# Patient Record
Sex: FEMALE | Race: WHITE | Employment: FULL TIME | URBAN - METROPOLITAN AREA
[De-identification: names, ages, dates, MRNs, and addresses within clinical notes are randomized per-mention and may not be internally consistent; named-entity substitution may affect disease eponyms.]

---

## 2017-08-10 ENCOUNTER — HOSPITAL ENCOUNTER (OUTPATIENT)
Dept: MAMMOGRAPHY | Age: 42
Discharge: HOME | End: 2017-08-10
Attending: OBSTETRICS & GYNECOLOGY
Payer: COMMERCIAL

## 2017-08-10 DIAGNOSIS — Z12.31 VISIT FOR SCREENING MAMMOGRAM: ICD-10-CM

## 2017-08-10 PROCEDURE — 77067 SCR MAMMO BI INCL CAD: CPT | Performed by: OBSTETRICS & GYNECOLOGY

## 2017-10-16 RX ORDER — NORETHINDRONE ACETATE AND ETHINYL ESTRADIOL AND FERROUS FUMARATE 1MG-20(21)
KIT ORAL
Qty: 28 TABLET | Refills: 5 | OUTPATIENT
Start: 2017-10-16

## 2017-10-19 RX ORDER — NORETHINDRONE ACETATE AND ETHINYL ESTRADIOL 1MG-20(21)
1 KIT ORAL
Qty: 3 PACKAGE | Refills: 0 | Status: SHIPPED | OUTPATIENT
Start: 2017-10-19 | End: 2018-01-03

## 2017-10-19 NOTE — TELEPHONE ENCOUNTER
Medications refilled per protocol. Pt has annual scheduled in January. Pt informed and verbalizes understanding.

## 2018-01-03 ENCOUNTER — OFFICE VISIT (OUTPATIENT)
Dept: OBGYN CLINIC | Facility: CLINIC | Age: 43
End: 2018-01-03

## 2018-01-03 VITALS
SYSTOLIC BLOOD PRESSURE: 112 MMHG | HEART RATE: 81 BPM | WEIGHT: 148.81 LBS | BODY MASS INDEX: 22.82 KG/M2 | DIASTOLIC BLOOD PRESSURE: 73 MMHG | HEIGHT: 67.75 IN

## 2018-01-03 DIAGNOSIS — Z12.31 VISIT FOR SCREENING MAMMOGRAM: ICD-10-CM

## 2018-01-03 DIAGNOSIS — G43.829 MENSTRUAL MIGRAINE WITHOUT STATUS MIGRAINOSUS, NOT INTRACTABLE: ICD-10-CM

## 2018-01-03 DIAGNOSIS — Z12.4 SCREENING FOR MALIGNANT NEOPLASM OF CERVIX: Primary | ICD-10-CM

## 2018-01-03 DIAGNOSIS — Z01.419 ENCOUNTER FOR GYNECOLOGICAL EXAMINATION WITHOUT ABNORMAL FINDING: ICD-10-CM

## 2018-01-03 PROCEDURE — 99212 OFFICE O/P EST SF 10 MIN: CPT | Performed by: OBSTETRICS & GYNECOLOGY

## 2018-01-03 PROCEDURE — 99396 PREV VISIT EST AGE 40-64: CPT | Performed by: OBSTETRICS & GYNECOLOGY

## 2018-01-03 RX ORDER — NORETHINDRONE ACETATE AND ETHINYL ESTRADIOL 1MG-20(21)
1 KIT ORAL
Qty: 3 PACKAGE | Refills: 3 | Status: SHIPPED | OUTPATIENT
Start: 2018-01-03 | End: 2018-06-04

## 2018-01-03 NOTE — PROGRESS NOTES
Kendal Palacio is a 40year old female [id number] Patient's last menstrual period was 12/18/2017 (within days).   Patient presents with:  Gyn Exam: annual exam & mammo order  Refill Request: Norethin Ace-Eth Estrad-FE ([address] [place] [address] 1/20) 1-20 MG-MCG   Pt c Clobetasol Propionate 0.05 % External Ointment, Apply to affected areas 1x/day as needed, Disp: 60 g, Rfl: 5    ALLERGIES:    Bactrim [Sulfametho*      Sulfamethoxazole        Hives      Review of Systems:  Constitutional:  Denies fatigue, night sweats, h position, mobility, without tenderness  Adnexa: normal without masses or tenderness  Perineum: normal  Anus: no hemorroids     Assessment & Plan:    Encounter for gynecological examination without abnormal finding  Menstrual migraine without status migrain

## 2018-01-04 LAB
HPV I/H RISK 1 DNA SPEC QL NAA+PROBE: NEGATIVE
LAST PAP RESULT: NORMAL

## 2018-01-09 RX ORDER — NORETHINDRONE ACETATE AND ETHINYL ESTRADIOL AND FERROUS FUMARATE 1MG-20(21)
1 KIT ORAL
Qty: 84 TABLET | Refills: 0 | OUTPATIENT
Start: 2018-01-09

## 2018-03-16 ENCOUNTER — TELEPHONE (OUTPATIENT)
Dept: OBGYN CLINIC | Facility: CLINIC | Age: 43
End: 2018-03-16

## 2018-03-16 NOTE — TELEPHONE ENCOUNTER
Pt states she is still having headaches when taking the brown pills on her BC. Asking if she should be switched to a 21 day instead of 28 day. pls adv.

## 2018-03-16 NOTE — TELEPHONE ENCOUNTER
Pt calling to report that she saw CAP for her annual in January. Pt stated that she has been on microgestin and continues to have headaches during the placebo pills in her pack.  Pt stated that she has tried hot tea & caffeine like CAP had suggested with no

## 2018-03-17 NOTE — TELEPHONE ENCOUNTER
Changing to a 21 day pack will not be helpful because all that does is leave out the placebo pills. We can try switching her to a 24 day pill  To extend the active pills ,such as minastrin but her insurance may not cover.   If that does not work then we ma

## 2018-03-19 RX ORDER — NORETHINDRONE ACETATE AND ETHINYL ESTRADIOL AND FERROUS FUMARATE 1MG-20(24)
1 KIT ORAL DAILY
Qty: 28 TABLET | Refills: 2 | Status: CANCELLED | OUTPATIENT
Start: 2018-03-19 | End: 2018-04-16

## 2018-03-19 NOTE — TELEPHONE ENCOUNTER
PT NOTIFIED NEW RX SENT FOR 3 MONTHS. ADVISED TO CALL US BACK BEFORE SHE RUNS OUT TO LET US KNOW HOW SHE IS DOING. PT VERBALIZED UNDERSTANDING.

## 2018-03-19 NOTE — TELEPHONE ENCOUNTER
Informed pt that CAP stated changing to a 21 day pack will not be helpful, because all that does is leave out the placebo pills.   Informed pt that CAP stated that we can try switching her to a 24 day pill, to extend the active pills, such as Minastrin but

## 2018-06-01 ENCOUNTER — TELEPHONE (OUTPATIENT)
Dept: PEDIATRICS CLINIC | Facility: CLINIC | Age: 43
End: 2018-06-01

## 2018-06-02 RX ORDER — NORETHINDRONE ACETATE AND ETHINYL ESTRADIOL AND FERROUS FUMARATE 1MG-20(24)
1 KIT ORAL DAILY
Qty: 28 TABLET | Refills: 6 | Status: SHIPPED | OUTPATIENT
Start: 2018-06-02 | End: 2018-06-04

## 2018-06-04 ENCOUNTER — TELEPHONE (OUTPATIENT)
Dept: OBGYN CLINIC | Facility: CLINIC | Age: 43
End: 2018-06-04

## 2018-06-04 RX ORDER — NORETHINDRONE ACETATE AND ETHINYL ESTRADIOL 1MG-20(21)
1 KIT ORAL
Qty: 3 PACKAGE | Refills: 1 | Status: SHIPPED | OUTPATIENT
Start: 2018-06-04 | End: 2018-12-17

## 2018-06-04 NOTE — TELEPHONE ENCOUNTER
Spoke to pharmacist who states pt came in to refill her rx and noticed it was now minastrin fe chewable tabs. Pt was not expecting this. See notes from previous phone call, pt requested this specifically.  Pharmacist states pt was given junel fe (generic of

## 2018-12-11 RX ORDER — NORETHINDRONE ACETATE/ETHINYL ESTRADIOL AND FERROUS FUMARATE 1MG-20(21)
KIT ORAL
Qty: 56 TABLET | Refills: 0 | OUTPATIENT
Start: 2018-12-11

## 2018-12-11 NOTE — TELEPHONE ENCOUNTER
LAST ANNUAL 1-3-18 (PAP NORMAL), LAST MAMMO 8-10-17 AND NO FUTURE APPT MADE. SENT BACK RX DENIED, NEEDS APPT.

## 2018-12-11 NOTE — TELEPHONE ENCOUNTER
HAS PILLS FOR THIS WEEK AND NEXT AND THEN SHE IS OUT. OK TO REFILL UNTIL 2-14-19? OR DOES SHE NEED HER MAMMO FIRST?

## 2018-12-14 NOTE — TELEPHONE ENCOUNTER
LMTCB. Need to confirm if she wants the rx sent to [facility].  Last rx sent to [facility] in [facility] in 6/2018.

## 2018-12-17 RX ORDER — NORETHINDRONE ACETATE AND ETHINYL ESTRADIOL 1MG-20(21)
1 KIT ORAL
Qty: 1 PACKAGE | Refills: 1 | Status: SHIPPED | OUTPATIENT
Start: 2018-12-17 | End: 2019-04-11

## 2018-12-20 ENCOUNTER — HOSPITAL ENCOUNTER (OUTPATIENT)
Dept: MAMMOGRAPHY | Age: 43
Discharge: HOME | End: 2018-12-20
Attending: OBSTETRICS & GYNECOLOGY
Payer: COMMERCIAL

## 2018-12-20 DIAGNOSIS — Z12.31 VISIT FOR SCREENING MAMMOGRAM: ICD-10-CM

## 2018-12-20 PROCEDURE — 77063 BREAST TOMOSYNTHESIS BI: CPT | Performed by: OBSTETRICS & GYNECOLOGY

## 2018-12-20 PROCEDURE — 77067 SCR MAMMO BI INCL CAD: CPT | Performed by: OBSTETRICS & GYNECOLOGY

## 2019-04-01 ENCOUNTER — TELEPHONE (OUTPATIENT)
Dept: OBGYN CLINIC | Facility: CLINIC | Age: 44
End: 2019-04-01

## 2019-04-01 RX ORDER — NORETHINDRONE ACETATE AND ETHINYL ESTRADIOL 1MG-20(21)
1 KIT ORAL DAILY
Qty: 1 PACKAGE | Refills: 0 | Status: SHIPPED | OUTPATIENT
Start: 2019-04-01 | End: 2019-04-11

## 2019-04-01 NOTE — TELEPHONE ENCOUNTER
Pt states in December Microgestin Fe 1/20 was sent to [facility] and she did well with it. Pt asking for one more refill be sent, because she is due to start the pill today. Pt has her Annual scheduled 4/2019 with CAP.     Mammogram from 12/2018 was normal.

## 2019-04-10 ENCOUNTER — TELEPHONE (OUTPATIENT)
Dept: PEDIATRICS CLINIC | Facility: CLINIC | Age: 44
End: 2019-04-10

## 2019-04-10 NOTE — TELEPHONE ENCOUNTER
Pt needed to reschedule annual due to work, so she can't come in until 7/11. Would like to have birth control refilled until then and sent to [facility] in [place].  pls adv

## 2019-04-10 NOTE — TELEPHONE ENCOUNTER
Pt was already given 1 pack of OCP on 4/1/19 to cover her until her annual that was originally scheduled for 4/25 but pt had to cancel appt. Message to CAP if OK for 3 additional refills of OCP to cover pt until annual scheduled for 7/11?

## 2019-04-11 RX ORDER — NORETHINDRONE ACETATE AND ETHINYL ESTRADIOL 1MG-20(21)
1 KIT ORAL DAILY
Qty: 3 PACKAGE | Refills: 0 | Status: SHIPPED | OUTPATIENT
Start: 2019-04-11 | End: 2019-08-01

## 2019-04-11 NOTE — TELEPHONE ENCOUNTER
PT NOTIFIED AUTHORIZATION FOR 3 MORE PACKS WAS SENT TO HER PHARMACY. PT AWARE SHE WILL GET NO FURTHER REFILLS WITHOUT BEING SEEN.

## 2019-04-11 NOTE — TELEPHONE ENCOUNTER
Ok to give additional refills until seen in July but please let her know that we cannot refill again if this appt not kept

## 2019-08-01 ENCOUNTER — OFFICE VISIT (OUTPATIENT)
Dept: OBGYN CLINIC | Facility: CLINIC | Age: 44
End: 2019-08-01
Payer: COMMERCIAL

## 2019-08-01 VITALS
BODY MASS INDEX: 25.14 KG/M2 | SYSTOLIC BLOOD PRESSURE: 122 MMHG | HEART RATE: 85 BPM | HEIGHT: 67 IN | WEIGHT: 160.19 LBS | DIASTOLIC BLOOD PRESSURE: 82 MMHG

## 2019-08-01 DIAGNOSIS — Z01.419 ENCOUNTER FOR GYNECOLOGICAL EXAMINATION WITHOUT ABNORMAL FINDING: Primary | ICD-10-CM

## 2019-08-01 DIAGNOSIS — Z12.31 VISIT FOR SCREENING MAMMOGRAM: ICD-10-CM

## 2019-08-01 PROCEDURE — 99396 PREV VISIT EST AGE 40-64: CPT | Performed by: OBSTETRICS & GYNECOLOGY

## 2019-08-01 RX ORDER — NORETHINDRONE ACETATE AND ETHINYL ESTRADIOL 1MG-20(21)
1 KIT ORAL DAILY
Qty: 3 PACKAGE | Refills: 3 | Status: SHIPPED | OUTPATIENT
Start: 2019-08-01 | End: 2020-06-22

## 2019-08-01 NOTE — PROGRESS NOTES
Desirae Moya is a 38year old female [id number] Patient's last menstrual period was 07/27/2019. Patient presents with:  Gyn Exam: annual  Medication Request: OCP refill  Her cycles are  regular. She has no complaints.     OBSTETRICS HISTORY:  OB History Attends meetings of clubs or organizations: Not on file        Relationship status: Not on file      Intimate partner violence:        Fear of current or ex partner: Not on file        Emotionally abused: Not on file        Physically abused: Not on file anxiety. Endocrine:   denies excessive thirst or urination. Heme/Lymph:  denies history of anemia, easy bruising or bleeding.       PHYSICAL EXAM:   Constitutional: well developed, well nourished  Head/Face: normocephalic  Neck/Thyroid: thyroid symmetric,

## 2020-01-02 ENCOUNTER — HOSPITAL ENCOUNTER (OUTPATIENT)
Dept: MAMMOGRAPHY | Age: 45
Discharge: HOME | End: 2020-01-02
Attending: OBSTETRICS & GYNECOLOGY
Payer: COMMERCIAL

## 2020-01-02 DIAGNOSIS — Z12.31 VISIT FOR SCREENING MAMMOGRAM: ICD-10-CM

## 2020-01-02 PROCEDURE — 77063 BREAST TOMOSYNTHESIS BI: CPT | Performed by: OBSTETRICS & GYNECOLOGY

## 2020-01-02 PROCEDURE — 77067 SCR MAMMO BI INCL CAD: CPT | Performed by: OBSTETRICS & GYNECOLOGY

## 2020-06-22 RX ORDER — NORETHINDRONE ACETATE/ETHINYL ESTRADIOL AND FERROUS FUMARATE 1MG-20(21)
KIT ORAL
Qty: 84 TABLET | Refills: 0 | OUTPATIENT
Start: 2020-06-22

## 2020-06-22 NOTE — TELEPHONE ENCOUNTER
Last annual was 8/1/2019. Last pap was 1/3/2018 - normal  Last mammo was 8/1/2019 - negative    Annual appt made on 8/31/2020. Refills for 3 months pended. Contraindication for med with iron supplements.   Message to CAP for approval.

## 2020-06-24 RX ORDER — NORETHINDRONE ACETATE AND ETHINYL ESTRADIOL 1MG-20(21)
1 KIT ORAL DAILY
Qty: 3 PACKAGE | Refills: 0 | Status: SHIPPED | OUTPATIENT
Start: 2020-06-24 | End: 2020-09-21

## 2020-09-21 RX ORDER — NORETHINDRONE ACETATE/ETHINYL ESTRADIOL AND FERROUS FUMARATE 1MG-20(21)
KIT ORAL
Qty: 84 TABLET | Refills: 0 | Status: SHIPPED | OUTPATIENT
Start: 2020-09-21 | End: 2020-11-02

## 2020-09-21 NOTE — TELEPHONE ENCOUNTER
LAST ANNUAL 8-1-19, LAST PAP 1-3-18, LAST MAMMO 8-1-19 AND NEXT ANNUAL 11-2-20. REQUEST TO CHANGE TO [facility] SINCE MICROGESTIN IS NOT AVAILABLE. AUTHORIZATION FOR #84 SENT TO PHARMACY PER PROTOCOL.

## 2020-11-02 ENCOUNTER — TELEPHONE (OUTPATIENT)
Dept: PEDIATRICS CLINIC | Facility: CLINIC | Age: 45
End: 2020-11-02

## 2020-11-02 ENCOUNTER — OFFICE VISIT (OUTPATIENT)
Dept: OBGYN CLINIC | Facility: CLINIC | Age: 45
End: 2020-11-02
Payer: COMMERCIAL

## 2020-11-02 VITALS
HEART RATE: 77 BPM | WEIGHT: 162 LBS | BODY MASS INDEX: 25 KG/M2 | SYSTOLIC BLOOD PRESSURE: 135 MMHG | DIASTOLIC BLOOD PRESSURE: 88 MMHG

## 2020-11-02 DIAGNOSIS — Z12.31 VISIT FOR SCREENING MAMMOGRAM: ICD-10-CM

## 2020-11-02 DIAGNOSIS — Z01.419 ENCOUNTER FOR GYNECOLOGICAL EXAMINATION WITHOUT ABNORMAL FINDING: Primary | ICD-10-CM

## 2020-11-02 PROCEDURE — 99396 PREV VISIT EST AGE 40-64: CPT | Performed by: OBSTETRICS & GYNECOLOGY

## 2020-11-02 PROCEDURE — 3075F SYST BP GE 130 - 139MM HG: CPT | Performed by: OBSTETRICS & GYNECOLOGY

## 2020-11-02 PROCEDURE — 3079F DIAST BP 80-89 MM HG: CPT | Performed by: OBSTETRICS & GYNECOLOGY

## 2020-11-02 RX ORDER — NORETHINDRONE ACETATE AND ETHINYL ESTRADIOL 1MG-20(21)
1 KIT ORAL DAILY
Qty: 2 PACKAGE | Refills: 3 | Status: SHIPPED | OUTPATIENT
Start: 2020-11-02 | End: 2020-11-02

## 2020-11-02 RX ORDER — NORETHINDRONE ACETATE AND ETHINYL ESTRADIOL 1MG-20(21)
1 KIT ORAL DAILY
Qty: 2 PACKAGE | Refills: 3 | Status: SHIPPED | OUTPATIENT
Start: 2020-11-02 | End: 2021-07-27

## 2020-11-02 NOTE — TELEPHONE ENCOUNTER
Patient stating her refill was sent to the wrong pharmacy.      [facility] pharmacy in [place] received the refill and she would like it to be sent to the pharmacy in [address] [place] [address].

## 2020-11-02 NOTE — PROGRESS NOTES
Divya Stack is a 41year old female [id number] Patient's last menstrual period was 10/29/2020. Patient presents with:  Gyn Exam  .     Her cycles are regular on ocps. She had some vaginal tingling immediately prior to her menses- no vaginal d/c.   This Yazid[employer] service: Not on file        Active member of club or organization: Not on file        Attends meetings of clubs or organizations: Not on file        Relationship status: Not on file      Intimate partner violence        Fear of current or ex part weakness or numbness. Psychiatric: denies depression or anxiety. Endocrine:   denies excessive thirst or urination. Heme/Lymph:  denies history of anemia, easy bruising or bleeding.       PHYSICAL EXAM:   Constitutional: well developed, well nourished  H

## 2021-01-14 ENCOUNTER — HOSPITAL ENCOUNTER (OUTPATIENT)
Dept: MAMMOGRAPHY | Age: 46
Discharge: HOME | End: 2021-01-14
Attending: OBSTETRICS & GYNECOLOGY
Payer: COMMERCIAL

## 2021-01-14 DIAGNOSIS — Z12.31 VISIT FOR SCREENING MAMMOGRAM: ICD-10-CM

## 2021-01-14 PROCEDURE — 77067 SCR MAMMO BI INCL CAD: CPT | Performed by: OBSTETRICS & GYNECOLOGY

## 2021-01-14 PROCEDURE — 77063 BREAST TOMOSYNTHESIS BI: CPT | Performed by: OBSTETRICS & GYNECOLOGY

## 2021-01-18 ENCOUNTER — TELEPHONE (OUTPATIENT)
Dept: OBGYN CLINIC | Facility: CLINIC | Age: 46
End: 2021-01-18

## 2021-01-18 NOTE — TELEPHONE ENCOUNTER
----- Message from Liliya Peralta MD sent at 1/15/2021 10:24 AM CST -----  Incomplete mammogram,needs additional views,call pt

## 2021-02-18 ENCOUNTER — HOSPITAL ENCOUNTER (OUTPATIENT)
Dept: MAMMOGRAPHY | Facility: HOSPITAL | Age: 46
Discharge: HOME | End: 2021-02-18
Attending: OBSTETRICS & GYNECOLOGY
Payer: COMMERCIAL

## 2021-02-18 DIAGNOSIS — R92.2 INCONCLUSIVE MAMMOGRAM: ICD-10-CM

## 2021-02-18 PROCEDURE — 76642 ULTRASOUND BREAST LIMITED: CPT | Performed by: OBSTETRICS & GYNECOLOGY

## 2021-02-18 PROCEDURE — 77061 BREAST TOMOSYNTHESIS UNI: CPT | Performed by: OBSTETRICS & GYNECOLOGY

## 2021-02-18 PROCEDURE — 77065 DX MAMMO INCL CAD UNI: CPT | Performed by: OBSTETRICS & GYNECOLOGY

## 2021-04-01 DIAGNOSIS — Z23 NEED FOR VACCINATION: ICD-10-CM

## 2021-07-23 NOTE — TELEPHONE ENCOUNTER
PSR please call pt and assist with scheduling annual due in November. Once appt is made then please send message back to address the rx refills. Thanks.   (last annual was 11/2020, 02/2021 Benign mammo)

## 2021-07-26 RX ORDER — NORETHINDRONE ACETATE AND ETHINYL ESTRADIOL AND FERROUS FUMARATE 1MG-20(21)
KIT ORAL
Qty: 56 TABLET | Refills: 0 | OUTPATIENT
Start: 2021-07-26

## 2021-07-27 RX ORDER — NORETHINDRONE ACETATE AND ETHINYL ESTRADIOL 1MG-20(21)
1 KIT ORAL DAILY
Qty: 84 TABLET | Refills: 1 | Status: SHIPPED | OUTPATIENT
Start: 2021-07-27 | End: 2022-01-10

## 2021-07-27 NOTE — TELEPHONE ENCOUNTER
Last annual was 11/2/2020. Pt running out due to original rx being written for 2 packs with 3 refills. Notes from last visit state to return in one year for annual.  Rx sent with refills to cover until annual in 11/2021. LM for pt that rx was sent.

## 2021-11-26 ENCOUNTER — TELEPHONE (OUTPATIENT)
Dept: OBGYN CLINIC | Facility: CLINIC | Age: 46
End: 2021-11-26

## 2021-11-26 ENCOUNTER — LAB ENCOUNTER (OUTPATIENT)
Dept: LAB | Age: 46
End: 2021-11-26
Attending: FAMILY MEDICINE
Payer: COMMERCIAL

## 2021-11-26 DIAGNOSIS — R30.9 PAINFUL URINATION: ICD-10-CM

## 2021-11-26 DIAGNOSIS — R30.9 PAINFUL URINATION: Primary | ICD-10-CM

## 2021-11-26 PROCEDURE — 87077 CULTURE AEROBIC IDENTIFY: CPT

## 2021-11-26 PROCEDURE — 87186 SC STD MICRODIL/AGAR DIL: CPT

## 2021-11-26 PROCEDURE — 81001 URINALYSIS AUTO W/SCOPE: CPT

## 2021-11-26 PROCEDURE — 87086 URINE CULTURE/COLONY COUNT: CPT

## 2021-11-26 NOTE — TELEPHONE ENCOUNTER
2 weeks ago, felt tingling sensation. Used Monistat 3. It burned badly but she still finished the 3 day treatment. Pt states things seemed to get better, but now she is having some pain at the end of urinating.   States it's not a pressure, but a \"tingl

## 2021-11-27 ENCOUNTER — TELEPHONE (OUTPATIENT)
Dept: OBGYN CLINIC | Facility: CLINIC | Age: 46
End: 2021-11-27

## 2021-11-27 RX ORDER — NITROFURANTOIN 25; 75 MG/1; MG/1
100 CAPSULE ORAL 2 TIMES DAILY
Qty: 14 CAPSULE | Refills: 0 | Status: SHIPPED | OUTPATIENT
Start: 2021-11-27 | End: 2021-12-04

## 2021-11-27 NOTE — TELEPHONE ENCOUNTER
Pt had UA, abnormal. Culture in progress. Will route to [address] on call to please review UA. Thank you.

## 2021-11-27 NOTE — TELEPHONE ENCOUNTER
Patient calling to review her results needs clarification on result from urine test. Please call at [phone],AMBREEN.   *If script is to be sent requesting Beatrice/[facility] where she is working today

## 2021-12-02 ENCOUNTER — OFFICE VISIT (OUTPATIENT)
Dept: OBGYN CLINIC | Facility: CLINIC | Age: 46
End: 2021-12-02
Payer: COMMERCIAL

## 2021-12-02 VITALS
DIASTOLIC BLOOD PRESSURE: 85 MMHG | SYSTOLIC BLOOD PRESSURE: 128 MMHG | WEIGHT: 173 LBS | BODY MASS INDEX: 27 KG/M2 | HEART RATE: 85 BPM

## 2021-12-02 DIAGNOSIS — N30.00 ACUTE CYSTITIS WITHOUT HEMATURIA: Primary | ICD-10-CM

## 2021-12-02 PROCEDURE — 99213 OFFICE O/P EST LOW 20 MIN: CPT | Performed by: OBSTETRICS & GYNECOLOGY

## 2021-12-02 PROCEDURE — 3074F SYST BP LT 130 MM HG: CPT | Performed by: OBSTETRICS & GYNECOLOGY

## 2021-12-02 PROCEDURE — 3079F DIAST BP 80-89 MM HG: CPT | Performed by: OBSTETRICS & GYNECOLOGY

## 2021-12-02 NOTE — PROGRESS NOTES
Rosa WEBB 1975       Patient presents with:  Gyn Problem: Pt thought she had a yeast infection, was told that it was a UTI.  Pt denies any symtoms currently  Patient is not feeling any symptoms at this time but she is here to follow-up d Oriented to time, place, person and situation.  Appropriate mood and affect    Pelvic Exam:  External Genitalia: normal appearance, hair distribution, and no lesions  Urethral Meatus:  normal in size, location, without lesions and prolapse  Bladder:  No ful

## 2021-12-28 ENCOUNTER — OFFICE VISIT (OUTPATIENT)
Dept: FAMILY MEDICINE CLINIC | Facility: CLINIC | Age: 46
End: 2021-12-28
Payer: COMMERCIAL

## 2021-12-28 VITALS
TEMPERATURE: 99 F | HEART RATE: 83 BPM | RESPIRATION RATE: 18 BRPM | HEIGHT: 67 IN | WEIGHT: 173 LBS | DIASTOLIC BLOOD PRESSURE: 90 MMHG | SYSTOLIC BLOOD PRESSURE: 130 MMHG | BODY MASS INDEX: 27.15 KG/M2 | OXYGEN SATURATION: 100 %

## 2021-12-28 DIAGNOSIS — J06.9 UPPER RESPIRATORY TRACT INFECTION, UNSPECIFIED TYPE: Primary | ICD-10-CM

## 2021-12-28 DIAGNOSIS — R09.81 NASAL CONGESTION: ICD-10-CM

## 2021-12-28 PROCEDURE — 3080F DIAST BP >= 90 MM HG: CPT | Performed by: PHYSICIAN ASSISTANT

## 2021-12-28 PROCEDURE — 3075F SYST BP GE 130 - 139MM HG: CPT | Performed by: PHYSICIAN ASSISTANT

## 2021-12-28 PROCEDURE — 3008F BODY MASS INDEX DOCD: CPT | Performed by: PHYSICIAN ASSISTANT

## 2021-12-28 PROCEDURE — 99213 OFFICE O/P EST LOW 20 MIN: CPT | Performed by: PHYSICIAN ASSISTANT

## 2021-12-28 NOTE — PATIENT INSTRUCTIONS
1. Flonase  2. Sudafed  3. Claritin  4. COVID pending  5. Follow up with PCP  6.  If worsening symptoms seek treatment          Coronavirus Disease 2019 (COVID-19)     [facility] is committed to the safety and well-being of our patients, mem consider include stopping quarantine  • After 14 days from date of last exposure  • After 10 days without testing from date of last exposure  • After day 7 from date of last exposure with a negative test result (test must occur on day 5 or later)  After st counters, tabletops, and doorknobs. Use household cleaning sprays or wipes according to the label instructions.          Seek Further Care     If you are awaiting test results or are confirmed positive for COVID -19, and your symptoms worsen at home with sy received a call within 2 business days, please call your primary care provider or check [employer] for results. Post-Discharge Follow-up  If you are diagnosed with COVID, refrain from exercise until approved by your primary care provider.  Please call your coronavirus disease 2019, [email or web address]. pdf  Centers for Disease Control & Prevention (CDC)  10 things you can do to manage your health at home, [employer] away from other people    References:  Long haulers: Why some people experience long-term coronavirus symptoms. (2021, February 08). Retrieved March 17, 2021, from https://[email or web address]/coronavirus/covid-19-information/covid-19-long-darnell. html  Long brain damage. · Your appetite may be poor, so a light diet is fine. Stay well hydrated by drinking 6 to 8 glasses of fluids per day (water, soft drinks, juices, tea, or soup). Extra fluids will help loosen secretions in the nose and lungs.   · Over-the-cou

## 2021-12-28 NOTE — PROGRESS NOTES
CHIEF COMPLAINT:     Patient presents with:  Sinus Problem      HPI:   Mercy Tam is a 40year old female who presents with complaints of feeling sick for the past 4 days.   Symptoms include nasal congestion, nasal drainage, sinus pressure, headache, abnormal pigmentation  HEAD: atraumatic, normocephalic  EYES: EOM intact, PERRL. Conjunctiva normal.  Cornea clear. Lid margins normal.  No active drainage.   EARS: Right TM normal, no bulging, no retraction, no fluid, bony landmarks normal.  Left TM norm who has COVID-19)  Anyone who has been in close contact with someone who has COVID-19 should quarantine at home for 14 days from the time of exposure and follow the below recommendations.   If you test positive for COVID-19, you should notify your family an reduced burden against a small possibility of spreading the virus. 10 Ways to Manage Your Health at Home      1. Stay home from work, school, and away from other public places.  If you must go out, avoid using any kind of public transportation, [employer] please contact your health care provider with any questions.     Home Isolation  If you have tested positive for COVID-19, you should remain under home isolation precautions following the below guidelines:  • At least 24 hours have passed since recovery def contains antibodies against the virus. The antibodies in plasma can be used as a treatment for patients in our community who are most severely affected by the virus. How can I donate convalescent plasma?     The process for donating plasma is very simila problems. Talk to your provider if you are not feeling well 4 or more weeks after being diagnosed with COVID-19.   Patients with Post-COVID conditions may experience one or more of the following symptoms:    Persistent severe fatigue Brain fog or trouble c This illness is contagious during the first few days. It is spread through the air by coughing and sneezing. It may also be spread by direct contact (touching the sick person and then touching your own eyes, nose, or mouth).  Frequent handwashing will decre medical advice  Call your healthcare provider right away if any of these occur:  · Cough with lots of colored sputum (mucus)  · Severe headache; face, neck, or ear pain  · Difficulty swallowing due to throat pain  · Fever of 100.4°F (38°C) or higher, or as

## 2022-01-10 ENCOUNTER — TELEPHONE (OUTPATIENT)
Dept: OBGYN CLINIC | Facility: CLINIC | Age: 47
End: 2022-01-10

## 2022-01-10 DIAGNOSIS — Z01.419 ENCOUNTER FOR GYNECOLOGICAL EXAMINATION WITHOUT ABNORMAL FINDING: Primary | ICD-10-CM

## 2022-01-10 RX ORDER — NORETHINDRONE ACETATE AND ETHINYL ESTRADIOL 1MG-20(21)
1 KIT ORAL DAILY
Qty: 28 TABLET | Refills: 0 | Status: SHIPPED | OUTPATIENT
Start: 2022-01-10

## 2022-01-10 RX ORDER — NORETHINDRONE ACETATE AND ETHINYL ESTRADIOL 1MG-20(21)
1 KIT ORAL DAILY
Qty: 84 TABLET | Refills: 0 | Status: SHIPPED | OUTPATIENT
Start: 2022-01-10 | End: 2022-01-10

## 2022-01-10 NOTE — TELEPHONE ENCOUNTER
Beatrice Glass    Pt made tiffanie on 4/21, need b/c refills to cover untiil then. Please advise, ok to leave detailed voicemail.

## 2022-01-10 NOTE — TELEPHONE ENCOUNTER
Next annual with MD is 4-21-22, CAP. Last annual 11-2-20l  Mamm 2-18-21 addl views, MD stated june normal repeat one year. Notes state cotest done 2018- neg pap, hpv neg, repeat pap needed in 3 years. Sent for 3 packs of ocps.  Can pt have one addl ocp

## 2022-01-11 NOTE — TELEPHONE ENCOUNTER
LM for pt on private VM that 4 packs of OCP was sent to cover pt until annual in April. Pt instructed to call back with any questions.

## 2022-04-12 ENCOUNTER — TELEPHONE (OUTPATIENT)
Dept: OBGYN CLINIC | Facility: CLINIC | Age: 47
End: 2022-04-12

## 2022-04-12 NOTE — TELEPHONE ENCOUNTER
Called pts pharmacy and spoke with Chris. javier stated that pt picked up 3 month supply of OCP on 1/10/22. Verbal given to javier for 1 pack of OCP to cover pt until annual on 4/21. Pt informed.

## 2022-04-21 ENCOUNTER — OFFICE VISIT (OUTPATIENT)
Dept: OBGYN CLINIC | Facility: CLINIC | Age: 47
End: 2022-04-21
Payer: COMMERCIAL

## 2022-04-21 VITALS
DIASTOLIC BLOOD PRESSURE: 85 MMHG | HEIGHT: 67.4 IN | BODY MASS INDEX: 26.37 KG/M2 | SYSTOLIC BLOOD PRESSURE: 135 MMHG | WEIGHT: 170 LBS | HEART RATE: 78 BPM

## 2022-04-21 DIAGNOSIS — Z01.419 ENCOUNTER FOR GYNECOLOGICAL EXAMINATION WITHOUT ABNORMAL FINDING: Primary | ICD-10-CM

## 2022-04-21 DIAGNOSIS — Z12.31 VISIT FOR SCREENING MAMMOGRAM: ICD-10-CM

## 2022-04-21 PROCEDURE — 3075F SYST BP GE 130 - 139MM HG: CPT | Performed by: OBSTETRICS & GYNECOLOGY

## 2022-04-21 PROCEDURE — 3079F DIAST BP 80-89 MM HG: CPT | Performed by: OBSTETRICS & GYNECOLOGY

## 2022-04-21 PROCEDURE — 3008F BODY MASS INDEX DOCD: CPT | Performed by: OBSTETRICS & GYNECOLOGY

## 2022-04-21 PROCEDURE — 99396 PREV VISIT EST AGE 40-64: CPT | Performed by: OBSTETRICS & GYNECOLOGY

## 2022-04-22 LAB — HPV I/H RISK 1 DNA SPEC QL NAA+PROBE: NEGATIVE

## 2022-04-22 RX ORDER — NORETHINDRONE ACETATE AND ETHINYL ESTRADIOL 1MG-20(21)
1 KIT ORAL DAILY
Qty: 84 TABLET | Refills: 3 | Status: SHIPPED | OUTPATIENT
Start: 2022-04-22

## 2022-04-28 ENCOUNTER — HOSPITAL ENCOUNTER (OUTPATIENT)
Dept: MAMMOGRAPHY | Age: 47
Discharge: HOME | End: 2022-04-28
Attending: OBSTETRICS & GYNECOLOGY
Payer: COMMERCIAL

## 2022-04-28 DIAGNOSIS — Z12.31 VISIT FOR SCREENING MAMMOGRAM: ICD-10-CM

## 2022-04-28 PROCEDURE — 77067 SCR MAMMO BI INCL CAD: CPT | Performed by: OBSTETRICS & GYNECOLOGY

## 2022-04-28 PROCEDURE — 77063 BREAST TOMOSYNTHESIS BI: CPT | Performed by: OBSTETRICS & GYNECOLOGY

## 2022-05-08 LAB — LAST PAP RESULT: NORMAL

## 2023-03-09 ENCOUNTER — OFFICE VISIT (OUTPATIENT)
Dept: OBGYN CLINIC | Facility: CLINIC | Age: 48
End: 2023-03-09

## 2023-03-09 VITALS
SYSTOLIC BLOOD PRESSURE: 129 MMHG | BODY MASS INDEX: 27 KG/M2 | WEIGHT: 177.63 LBS | DIASTOLIC BLOOD PRESSURE: 86 MMHG | TEMPERATURE: 97 F

## 2023-03-09 DIAGNOSIS — N90.89 VULVAR IRRITATION: Primary | ICD-10-CM

## 2023-03-09 PROCEDURE — 3079F DIAST BP 80-89 MM HG: CPT | Performed by: OBSTETRICS & GYNECOLOGY

## 2023-03-09 PROCEDURE — 3074F SYST BP LT 130 MM HG: CPT | Performed by: OBSTETRICS & GYNECOLOGY

## 2023-03-09 PROCEDURE — 99213 OFFICE O/P EST LOW 20 MIN: CPT | Performed by: OBSTETRICS & GYNECOLOGY

## 2023-03-09 RX ORDER — CLOBETASOL PROPIONATE 0.5 MG/G
1 OINTMENT TOPICAL 2 TIMES DAILY
Qty: 30 G | Refills: 0 | Status: SHIPPED | OUTPATIENT
Start: 2023-03-09

## 2023-03-11 LAB
GENITAL VAGINOSIS SCREEN: NEGATIVE
TRICHOMONAS SCREEN: NEGATIVE

## 2023-04-01 DIAGNOSIS — Z01.419 ENCOUNTER FOR GYNECOLOGICAL EXAMINATION WITHOUT ABNORMAL FINDING: ICD-10-CM

## 2023-04-04 RX ORDER — NORETHINDRONE ACETATE AND ETHINYL ESTRADIOL AND FERROUS FUMARATE 1MG-20(21)
KIT ORAL
Qty: 28 TABLET | Refills: 1 | Status: SHIPPED | OUTPATIENT
Start: 2023-04-04

## 2023-04-05 NOTE — TELEPHONE ENCOUNTER
Last annual - 4/21/20224/21/2022  Last pap - 4/21/2023, negative  Last mammo - 4/28/2022, negative      Annual scheduled on 5/4/2023. Rx sent to cover until appt.

## 2023-05-04 ENCOUNTER — OFFICE VISIT (OUTPATIENT)
Dept: OBGYN CLINIC | Facility: CLINIC | Age: 48
End: 2023-05-04

## 2023-05-04 VITALS
DIASTOLIC BLOOD PRESSURE: 89 MMHG | SYSTOLIC BLOOD PRESSURE: 138 MMHG | BODY MASS INDEX: 28 KG/M2 | HEART RATE: 78 BPM | WEIGHT: 178 LBS

## 2023-05-04 DIAGNOSIS — Z01.419 ENCOUNTER FOR GYNECOLOGICAL EXAMINATION WITHOUT ABNORMAL FINDING: ICD-10-CM

## 2023-05-04 DIAGNOSIS — Z12.31 VISIT FOR SCREENING MAMMOGRAM: Primary | ICD-10-CM

## 2023-05-04 RX ORDER — NORETHINDRONE ACETATE AND ETHINYL ESTRADIOL 1MG-20(21)
1 KIT ORAL DAILY
Qty: 84 TABLET | Refills: 3 | Status: SHIPPED | OUTPATIENT
Start: 2023-05-04

## 2023-05-18 ENCOUNTER — HOSPITAL ENCOUNTER (OUTPATIENT)
Dept: MAMMOGRAPHY | Age: 48
Discharge: HOME | End: 2023-05-18
Attending: OBSTETRICS & GYNECOLOGY
Payer: COMMERCIAL

## 2023-05-18 DIAGNOSIS — Z12.31 VISIT FOR SCREENING MAMMOGRAM: ICD-10-CM

## 2023-05-18 PROCEDURE — 77067 SCR MAMMO BI INCL CAD: CPT | Performed by: OBSTETRICS & GYNECOLOGY

## 2023-05-18 PROCEDURE — 77063 BREAST TOMOSYNTHESIS BI: CPT | Performed by: OBSTETRICS & GYNECOLOGY

## 2024-02-02 ENCOUNTER — OFFICE VISIT (OUTPATIENT)
Dept: OBGYN CLINIC | Facility: CLINIC | Age: 49
End: 2024-02-02

## 2024-02-02 VITALS
WEIGHT: 175 LBS | SYSTOLIC BLOOD PRESSURE: 135 MMHG | DIASTOLIC BLOOD PRESSURE: 89 MMHG | HEART RATE: 75 BPM | BODY MASS INDEX: 27 KG/M2

## 2024-02-02 DIAGNOSIS — B37.31 YEAST VAGINITIS: Primary | ICD-10-CM

## 2024-02-02 PROCEDURE — 99213 OFFICE O/P EST LOW 20 MIN: CPT | Performed by: NURSE PRACTITIONER

## 2024-02-02 RX ORDER — NYSTATIN 100000 U/G
1 CREAM TOPICAL 2 TIMES DAILY
Qty: 30 G | Refills: 0 | Status: SHIPPED | OUTPATIENT
Start: 2024-02-02

## 2024-02-02 RX ORDER — FLUCONAZOLE 150 MG/1
150 TABLET ORAL AS DIRECTED
Qty: 2 TABLET | Refills: 0 | Status: SHIPPED | OUTPATIENT
Start: 2024-02-02

## 2024-02-02 NOTE — PROGRESS NOTES
[facility]   Obstetrics and Gynecology    Jessica Mcdowell is a 48 year old female  Patient's last menstrual period was 2024 (approximate).   Chief Complaint   Patient presents with    Gyn Problem     C/O POSSIBLE VAGINAL INFECTION, IRRITATION & ITCHING    .   Saw dr. Stinson in 2023 for her annual.    She had sinus infection 3 weeks ago - took amoxicillin. Had yeast infection - did monistat for 7 days and things improved. But in last 3 days - itching, burning  externally and no abnormal discharge, no urinary symptoms, no odor, no pelvic pain.    She is sexually active same partner x 19 years.    Pap:2022 NILM, neg HPV  Contraception: ocps    OBSTETRICS HISTORY:  OB History    Para Term  AB Living   5 3 3 0 2 3   SAB IAB Ectopic Multiple Live Births   0 0 0 0 3       GYNE HISTORY:  Menarche: 16 (2024  9:45 AM)  Period Cycle (Days): 28 DAYS (2024  9:45 AM)  Period Duration (Days): 5-7 DAYS (2024  9:45 AM)  Period Flow: LIGHT (2024  9:45 AM)  Use of Birth Control (if yes, specify type): OCP (2024  9:45 AM)  Date When Birth Control Last Used: 24 OCP (2024  9:45 AM)  Hx Prior Abnormal Pap: No (2024  9:45 AM)  Pap Date: 22 (2024  9:45 AM)  Pap Result Notes: NEGATIVE & HPV NEGATIVE (2024  9:45 AM)  Follow Up Recommendation: MAMMO BILATERAL 23 KIARA NEG (2024  9:45 AM)      History   Sexual Activity    Sexual activity: Yes    Partners: Male    Birth control/ protection: OCP       MEDICAL HISTORY:  Past Medical History:   Diagnosis Date    Anemia in pregnancy 2014    Psoriasis        SOCIAL HISTORY:  Social History     Socioeconomic History    Marital status: Single     Spouse name: Not on file    Number of children: Not on file    Years of education: Not on file    Highest education level: Not on file   Occupational History    Not on file   Tobacco Use    Smoking status: Never    Smokeless tobacco: Never   Substance and Sexual  Activity    Alcohol use: No     Alcohol/week: 0.0 standard drinks of alcohol    Drug use: No    Sexual activity: Yes     Partners: Male     Birth control/protection: OCP   Other Topics Concern    Grew up on a farm Not Asked    History of tanning Not Asked    Outdoor occupation Not Asked    Pt has a pacemaker No    Pt has a defibrillator No    Breast feeding No    Reaction to local anesthetic No   Social History Narrative    Not on file     Social Determinants of Health     Financial Resource Strain: Not on file   Food Insecurity: Not on file   Transportation Needs: Not on file   Physical Activity: Not on file   Stress: Not on file   Social Connections: Not on file   Housing Stability: Not on file       MEDICATIONS:    Current Outpatient Medications:     fluconazole (DIFLUCAN) 150 MG Oral Tab, Take 1 tablet (150 mg total) by mouth As Directed. Take one tablet by mouth today, repeat one tablet by mouth in 3 days, Disp: 2 tablet, Rfl: 0    nystatin 100,000 Units/g External Cream, Apply 1 Application topically 2 (two) times daily., Disp: 30 g, Rfl: 0    Norethin Ace-Eth Estrad-FE (LEA FE 1/20) 1-20 MG-MCG Oral Tab, Take 1 tablet by mouth daily., Disp: 84 tablet, Rfl: 3    clobetasol 0.05 % External Ointment, Apply 1 Application topically 2 (two) times daily. (Patient not taking: Reported on 2/2/2024), Disp: 30 g, Rfl: 0    ALLERGIES:    Allergies   Allergen Reactions    Sulfamethoxazole W/Trimethoprim HIVES and RASH    Bactrim [Sulfamethoxazole W/Trimethoprim, Co-Trimoxazole]     Sulfamethoxazole HIVES         Review of Systems:  Constitutional:  Denies fatigue, night sweats, hot flashes  Cardiovascular:  denies chest pain or palpitations  Respiratory:  denies shortness of breath  Gastrointestinal:  denies heartburn, abdominal pain, diarrhea or constipation  Genitourinary:  denies dysuria, incontinence, abnormal vaginal discharge, +vaginal itching   Musculoskeletal:  denies back pain.  Skin/Breast:  Denies any breast  pain, lumps, or discharge.   Neurological:  denies headaches, extremity weakness or numbness.  Psychiatric: denies depression or anxiety.  Endocrine:   denies excessive thirst or urination.  Heme/Lymph:  denies history of anemia, easy bruising or bleeding.      PHYSICAL EXAM:     Vitals:    02/02/24 0951   BP: 135/89   Pulse: 75   Weight: 175 lb (79.4 kg)     Body mass index is 27.08 kg/m².     Constitutional: well developed, well nourished    Psychiatric:  Oriented to time, place, person and situation. Appropriate mood and affect  Pelvic Exam:  External Genitalia: external erythema with some excoriation, hair distribution, and no lesions  Urethral Meatus:  normal in size, location, without lesions and prolapse  Bladder:  No fullness, masses or tenderness  Vagina:  Normal appearance without lesions, no abnormal discharge  Cervix:  Normal without tenderness on motion  Uterus: normal in size, contour, position, mobility, without tenderness  Adnexa: normal without masses or tenderness  Perineum: normal  Anus: no hemorroids   Lymph node: no inguinal lymph nodes    Assessment & Plan:  Jessica was seen today for gyn problem.    Diagnoses and all orders for this visit:    Yeast vaginitis  -     fluconazole (DIFLUCAN) 150 MG Oral Tab; Take 1 tablet (150 mg total) by mouth As Directed. Take one tablet by mouth today, repeat one tablet by mouth in 3 days  -     nystatin 100,000 Units/g External Cream; Apply 1 Application topically 2 (two) times daily.  -     Vaginitis Vaginosis PCR Panel; Future    Will rx diflucan 150 mg PO x1 and repeat one tablet in 3 days.  Topical nystatin twice daily x 7 days.  ORLANDO Duncan    This note was prepared using Dragon Medical voice recognition dictation software. As a result errors may occur. When identified these errors have been corrected. While every attempt is made to correct errors during dictation discrepancies may still exist.

## 2024-02-03 LAB
BV BACTERIA DNA VAG QL NAA+PROBE: NEGATIVE
C GLABRATA DNA VAG QL NAA+PROBE: NEGATIVE
C KRUSEI DNA VAG QL NAA+PROBE: NEGATIVE
CANDIDA DNA VAG QL NAA+PROBE: POSITIVE
T VAGINALIS DNA VAG QL NAA+PROBE: NEGATIVE

## 2024-04-14 DIAGNOSIS — Z01.419 ENCOUNTER FOR GYNECOLOGICAL EXAMINATION WITHOUT ABNORMAL FINDING: ICD-10-CM

## 2024-04-15 RX ORDER — NORETHINDRONE ACETATE AND ETHINYL ESTRADIOL AND FERROUS FUMARATE 1MG-20(21)
1 KIT ORAL DAILY
Qty: 84 TABLET | Refills: 0 | Status: SHIPPED | OUTPATIENT
Start: 2024-04-15

## 2024-04-15 NOTE — TELEPHONE ENCOUNTER
Requested Prescriptions     Pending Prescriptions Disp Refills    OLESYA PRASAD 1/20 1-20 MG-MCG Oral Tab [Pharmacy Med Name: Olesya PRASAD 1/20 Oral Tablet 1-20 MG-MCG] 84 tablet 0     Sig: TAKE ONE TABLET BY MOUTH ONE TIME DAILY     Last annual 5/4/23  Last filled 5/4/23 x 1 year  Pap UTD  Mammo 5/18/24    Next annual 5/23/24

## 2024-04-23 ENCOUNTER — TELEPHONE (OUTPATIENT)
Dept: OBGYN CLINIC | Facility: CLINIC | Age: 49
End: 2024-04-23

## 2024-04-23 RX ORDER — FLUCONAZOLE 150 MG/1
150 TABLET ORAL ONCE
Qty: 1 TABLET | Refills: 0 | Status: SHIPPED | OUTPATIENT
Start: 2024-04-23 | End: 2024-04-23

## 2024-04-23 NOTE — TELEPHONE ENCOUNTER
Noted. Rx ordered per JANES and sent to pt's preferred pharmacy. RN left detailed message and advised to call back if pt has any questions.       Layo Spencer, DO   to [facility] Ob/Gyne Clinical Staff   Okay for diflucan

## 2024-04-23 NOTE — TELEPHONE ENCOUNTER
Finished antibiotics from dentist; now has yeast infection. Tried over the counter with no relief. Pharmacy confirmed. Ok to leave detailed voicemail, patient will be at work.

## 2024-04-23 NOTE — TELEPHONE ENCOUNTER
Pt called states she had finished antibiotics post dental care and developed yeast infection. Has used Monistat 7 and was eating high culture yogurt. Pt asking for Diflucan RX, still having discharge and itching.     To CESAR MARRERO pt who is currently out of office. Please advise. Thank you.

## 2024-07-07 DIAGNOSIS — Z01.419 ENCOUNTER FOR GYNECOLOGICAL EXAMINATION WITHOUT ABNORMAL FINDING: ICD-10-CM

## 2024-07-08 RX ORDER — NORETHINDRONE ACETATE AND ETHINYL ESTRADIOL AND FERROUS FUMARATE 1MG-20(21)
1 KIT ORAL DAILY
Qty: 28 TABLET | Refills: 0 | Status: SHIPPED | OUTPATIENT
Start: 2024-07-08 | End: 2024-08-24

## 2024-08-07 ENCOUNTER — TELEPHONE (OUTPATIENT)
Dept: OBGYN CLINIC | Facility: CLINIC | Age: 49
End: 2024-08-07

## 2024-08-07 NOTE — TELEPHONE ENCOUNTER
Patient having BV or yeast infection, raw fish 1-week ago with diarrhea, given anitbiotics, lower back pain and nausea, achy still.    Pls advise -no appointment until 8/15.

## 2024-08-07 NOTE — TELEPHONE ENCOUNTER
Jessica seen in Urgent Care 7/19 for diarrhea after eating raw fish. urgent care advised bland diet. Patient returned to urgent care 7/25 for UTI symptoms. Was treated with Keflex 500 mg BID x 7 days.   Since completing Keflex, started to having tingling sensation in vagina.  reports diarrhea returned for a few days, but none x 48 hours. States symptoms tingling is consistent with early yeast infection for her. She has already tried Monistat 3 day without resolution.      Scheduled with Abena Haas on 8/8. Patient verbalized understanding.

## 2024-08-08 ENCOUNTER — OFFICE VISIT (OUTPATIENT)
Dept: OBGYN CLINIC | Facility: CLINIC | Age: 49
End: 2024-08-08

## 2024-08-08 ENCOUNTER — LAB ENCOUNTER (OUTPATIENT)
Dept: LAB | Facility: HOSPITAL | Age: 49
End: 2024-08-08
Attending: NURSE PRACTITIONER
Payer: COMMERCIAL

## 2024-08-08 VITALS
SYSTOLIC BLOOD PRESSURE: 119 MMHG | WEIGHT: 170.81 LBS | DIASTOLIC BLOOD PRESSURE: 86 MMHG | BODY MASS INDEX: 26 KG/M2 | HEART RATE: 71 BPM

## 2024-08-08 DIAGNOSIS — Z01.419 ENCOUNTER FOR GYNECOLOGICAL EXAMINATION WITHOUT ABNORMAL FINDING: ICD-10-CM

## 2024-08-08 DIAGNOSIS — R10.2 PELVIC PAIN: ICD-10-CM

## 2024-08-08 DIAGNOSIS — N89.8 VAGINAL IRRITATION: ICD-10-CM

## 2024-08-08 DIAGNOSIS — N89.8 VAGINAL IRRITATION: Primary | ICD-10-CM

## 2024-08-08 LAB
BILIRUB UR QL: NEGATIVE
COLOR UR: YELLOW
GLUCOSE UR-MCNC: NORMAL MG/DL
HGB UR QL STRIP.AUTO: NEGATIVE
KETONES UR-MCNC: NEGATIVE MG/DL
LEUKOCYTE ESTERASE UR QL STRIP.AUTO: 500
NITRITE UR QL STRIP.AUTO: NEGATIVE
PH UR: 6 [PH] (ref 5–8)
PROT UR-MCNC: 30 MG/DL
SP GR UR STRIP: 1.03 (ref 1–1.03)
UROBILINOGEN UR STRIP-ACNC: 2

## 2024-08-08 PROCEDURE — 99212 OFFICE O/P EST SF 10 MIN: CPT | Performed by: NURSE PRACTITIONER

## 2024-08-08 PROCEDURE — 81001 URINALYSIS AUTO W/SCOPE: CPT

## 2024-08-08 PROCEDURE — 87086 URINE CULTURE/COLONY COUNT: CPT

## 2024-08-08 RX ORDER — NORETHINDRONE ACETATE AND ETHINYL ESTRADIOL AND FERROUS FUMARATE 1MG-20(21)
1 KIT ORAL DAILY
Qty: 28 TABLET | Refills: 0 | OUTPATIENT
Start: 2024-08-08

## 2024-08-08 NOTE — TELEPHONE ENCOUNTER
Last annual - 5/4/2023  Las pap - 4/21/2022, negative  Last mammogram - 5/18/2023, negative    Patient has appointment with Abena Haas today.  Refills denied.  Abena Haas can give refills if appropriate at appointment.

## 2024-08-08 NOTE — PROGRESS NOTES
[facility]   Obstetrics and Gynecology    Jessica Mcdowell is a 49 year old female  Patient's last menstrual period was 2024 (approximate).   Chief Complaint   Patient presents with    Gyn Problem     Pelvic pain, vaginal tingling sensation    . Patient reports 2 weeks ago ate raw fish and had diarrhea afterward for 24 hours. She reports she went to [facility] for this. Then diarrhea continued for another 4 days, went back to urgent care. She had a UA and told had a vaginal infection and UTI.  Took antiobitc for 7 days- cephalexin. And symptoms resolved.    5 days ago age tuna - next day had diarrhea - took immodium.    Now feeling vaginal tingling and low pelvic ache that comes and goes.    No urinary or bowel concerns, good appetite.  No fever, no nausea or vomiting.  No abnormal discharge or odor. Taking more frequent showers when had diarrhea.    She is sexually active same partner x 19 years.     Pap:2022 NILM, neg HPV  Contraception: ocps    OBSTETRICS HISTORY:  OB History    Para Term  AB Living   5 3 3 0 2 3   SAB IAB Ectopic Multiple Live Births   0 0 0 0 3       GYNE HISTORY:  Menarche: 16 (2024  2:36 PM)  Period Cycle (Days): 28 DAYS (2024  2:36 PM)  Period Duration (Days): 5-7 DAYS (2024  2:36 PM)  Period Flow: LIGHT (2024  2:36 PM)  Use of Birth Control (if yes, specify type): OCP (2024  2:36 PM)  Date When Birth Control Last Used: 24 OCP (2024  2:36 PM)  Hx Prior Abnormal Pap: No (2024  2:36 PM)  Pap Date: 22 (2024  2:36 PM)  Pap Result Notes: NEGATIVE & HPV NEGATIVE (2024  2:36 PM)  Follow Up Recommendation: MAMMO BILATERAL 23 KIARA NEG (2024  9:45 AM)      History   Sexual Activity    Sexual activity: Yes    Partners: Male    Birth control/ protection: OCP       MEDICAL HISTORY:  Past Medical History:    Anemia in pregnancy (HCC)    Psoriasis       SOCIAL HISTORY:  Social History     Socioeconomic History     Marital status: Single     Spouse name: Not on file    Number of children: Not on file    Years of education: Not on file    Highest education level: Not on file   Occupational History    Not on file   Tobacco Use    Smoking status: Never    Smokeless tobacco: Never   Substance and Sexual Activity    Alcohol use: No     Alcohol/week: 0.0 standard drinks of alcohol    Drug use: No    Sexual activity: Yes     Partners: Male     Birth control/protection: OCP   Other Topics Concern    Grew up on a farm Not Asked    History of tanning Not Asked    Outdoor occupation Not Asked    Pt has a pacemaker No    Pt has a defibrillator No    Breast feeding No    Reaction to local anesthetic No   Social History Narrative    Not on file     Social Determinants of Health     Financial Resource Strain: Not on file   Food Insecurity: Not on file   Transportation Needs: Not on file   Physical Activity: Not on file   Stress: Not on file   Social Connections: Unknown (3/14/2021)    [facility], [facility]    Social Connections     Conversations with friends/family/neighbors per week: Not on file   Housing Stability: Low Risk  (7/9/2021)    Received from [facility], [facility]    Housing Stability     Mortgage Payment Concerns?: Not on file     Number of Places Lived in the Last Year: Not on file     Unstable Housing?: Not on file       MEDICATIONS:    Current Outpatient Medications:     Norethin Ace-Eth Estrad-FE (LEA FE 1/20) 1-20 MG-MCG Oral Tab, TAKE ONE TABLET BY MOUTH ONE TIME DAILY, Disp: 28 tablet, Rfl: 0    fluconazole (DIFLUCAN) 150 MG Oral Tab, Take 1 tablet (150 mg total) by mouth As Directed. Take one tablet by mouth today, repeat one tablet by mouth in 3 days, Disp: 2 tablet, Rfl: 0    nystatin 100,000 Units/g External Cream, Apply 1 Application topically 2 (two) times daily., Disp: 30 g, Rfl: 0    clobetasol 0.05 % External  Ointment, Apply 1 Application topically 2 (two) times daily., Disp: 30 g, Rfl: 0    ALLERGIES:    Allergies   Allergen Reactions    Sulfamethoxazole W/Trimethoprim HIVES and RASH    Bactrim [Sulfamethoxazole W/Trimethoprim, Co-Trimoxazole]     Sulfamethoxazole HIVES         Review of Systems:  Constitutional:  Denies fatigue, night sweats, hot flashes  Cardiovascular:  denies chest pain or palpitations  Respiratory:  denies shortness of breath  Gastrointestinal:  denies heartburn, abdominal pain, diarrhea or constipation  Genitourinary:  denies dysuria, incontinence, abnormal vaginal discharge, vaginal itching see hPI  Musculoskeletal:  denies back pain.  Skin/Breast:  Denies any breast pain, lumps, or discharge.   Neurological:  denies headaches, extremity weakness or numbness.  Psychiatric: denies depression or anxiety.  Endocrine:   denies excessive thirst or urination.  Heme/Lymph:  denies history of anemia, easy bruising or bleeding.      PHYSICAL EXAM:     Vitals:    08/08/24 1439   BP: 119/86   Pulse: 71   Weight: 170 lb 12.8 oz (77.5 kg)     Body mass index is 26.43 kg/m².     Patient offered chaperone, patient declined    Constitutional: well developed, well nourished    Psychiatric:  Oriented to time, place, person and situation. Appropriate mood and affect    Pelvic Exam:  External Genitalia: normal appearance, hair distribution, and no lesions  Urethral Meatus:  normal in size, location, without lesions and prolapse  Bladder:  No fullness, masses or tenderness  Vagina:  Normal appearance without lesions, no abnormal discharge  Cervix:  Normal without tenderness on motion  Uterus: normal in size, contour, position, mobility, without tenderness  Adnexa: normal without masses or tenderness  Perineum: normal  Anus: no hemorroids   Lymph node: no inguinal lymph nodes    Assessment & Plan:  Jessica was seen today for gyn problem.    Diagnoses and all orders for this visit:    Vaginal irritation  -      Vaginitis Vaginosis PCR Panel; Future  -     Urinalysis, Routine; Future  -     Urine Culture, Routine; Future    Pelvic pain  -     Vaginitis Vaginosis PCR Panel; Future  -     Urinalysis, Routine; Future  -     Urine Culture, Routine; Future    Cultures done and urine ordered  If normal can order pelvic ultrasound but would recommend follow up with PCP since 2 episodes of diarrhea. Reviewed pelvic discomfort could also be GI related      ORLANDO Salamanca    This note was prepared using Dragon Medical voice recognition dictation software. As a result errors may occur. When identified these errors have been corrected. While every attempt is made to correct errors during dictation discrepancies may still exist.

## 2024-08-09 LAB
BV BACTERIA DNA VAG QL NAA+PROBE: POSITIVE
C GLABRATA DNA VAG QL NAA+PROBE: NEGATIVE
C KRUSEI DNA VAG QL NAA+PROBE: NEGATIVE
CANDIDA DNA VAG QL NAA+PROBE: NEGATIVE
T VAGINALIS DNA VAG QL NAA+PROBE: NEGATIVE

## 2024-08-09 RX ORDER — METRONIDAZOLE 500 MG/1
500 TABLET ORAL 2 TIMES DAILY
Qty: 14 TABLET | Refills: 0 | Status: SHIPPED | OUTPATIENT
Start: 2024-08-09

## 2024-08-22 ENCOUNTER — OFFICE VISIT (OUTPATIENT)
Dept: OBGYN CLINIC | Facility: CLINIC | Age: 49
End: 2024-08-22

## 2024-08-22 VITALS
SYSTOLIC BLOOD PRESSURE: 142 MMHG | WEIGHT: 173 LBS | BODY MASS INDEX: 26.84 KG/M2 | HEIGHT: 67.4 IN | HEART RATE: 70 BPM | DIASTOLIC BLOOD PRESSURE: 90 MMHG

## 2024-08-22 DIAGNOSIS — R10.2 PELVIC PRESSURE IN FEMALE: Primary | ICD-10-CM

## 2024-08-22 PROCEDURE — 99212 OFFICE O/P EST SF 10 MIN: CPT | Performed by: NURSE PRACTITIONER

## 2024-08-22 NOTE — PROGRESS NOTES
[facility]   Obstetrics and Gynecology    Jessica Mcdowell is a 49 year old female  Patient's last menstrual period was 2024 (approximate).   Chief Complaint   Patient presents with    Follow - Up     Follow up ON bacterial vaginosis, MAKE SURE EVERYTHING IS CLEAR   .last seen on 2024. Finished medication for bacterial vaginosis. Feeling much better    Since finishing metronidazole - still noticed pelvic pain and back pain starting up again but very mild.. Started pre and probiotic.  Feels some tingling sensation in vagina. No pain now and diarrhea resolved.  No more diarrhea since last visit.    She is sexually active same partner x 19 years.     Pap:2022 NILM, neg HPV  Contraception: ocps    OBSTETRICS HISTORY:  OB History    Para Term  AB Living   5 3 3 0 2 3   SAB IAB Ectopic Multiple Live Births   0 0 0 0 3       GYNE HISTORY:  Menarche: 16 (2024  6:24 PM)  Period Cycle (Days): 28 DAYS (2024  6:24 PM)  Period Duration (Days): 5-7 DAYS (2024  6:24 PM)  Period Flow: LIGHT (2024  6:24 PM)  Use of Birth Control (if yes, specify type): OCP (2024  6:24 PM)  Date When Birth Control Last Used: 24 OCP (2024  6:24 PM)  Hx Prior Abnormal Pap: No (2024  6:24 PM)  Pap Date: 22 (2024  6:24 PM)  Pap Result Notes: NEGATIVE & HPV NEGATIVE (2024  6:24 PM)  Follow Up Recommendation: MAMMO BILATERAL 23 KIARA NEG (2024  9:45 AM)      History   Sexual Activity    Sexual activity: Yes    Partners: Male    Birth control/ protection: OCP       MEDICAL HISTORY:  Past Medical History:    Anemia in pregnancy (HCC)    Psoriasis       SOCIAL HISTORY:  Social History     Socioeconomic History    Marital status: Single     Spouse name: Not on file    Number of children: Not on file    Years of education: Not on file    Highest education level: Not on file   Occupational History    Not on file   Tobacco Use    Smoking status: Never     Smokeless tobacco: Never   Substance and Sexual Activity    Alcohol use: No     Alcohol/week: 0.0 standard drinks of alcohol    Drug use: No    Sexual activity: Yes     Partners: Male     Birth control/protection: OCP   Other Topics Concern    Grew up on a farm Not Asked    History of tanning Not Asked    Outdoor occupation Not Asked    Pt has a pacemaker No    Pt has a defibrillator No    Breast feeding No    Reaction to local anesthetic No   Social History Narrative    Not on file     Social Determinants of Health     Financial Resource Strain: Not on file   Food Insecurity: Not on file   Transportation Needs: Not on file   Physical Activity: Not on file   Stress: Not on file   Social Connections: Unknown (3/14/2021)    Received from [facility], [facility]    Social Connections     Conversations with friends/family/neighbors per week: Not on file   Housing Stability: Low Risk  (7/9/2021)    Received from [facility], [facility]    Housing Stability     Mortgage Payment Concerns?: Not on file     Number of Places Lived in the Last Year: Not on file     Unstable Housing?: Not on file       MEDICATIONS:    Current Outpatient Medications:     metRONIDAZOLE (FLAGYL) 500 MG Oral Tab, Take 1 tablet (500 mg total) by mouth in the morning and 1 tablet (500 mg total) before bedtime., Disp: 14 tablet, Rfl: 0    Norethin Ace-Eth Estrad-FE (LEA FE 1/20) 1-20 MG-MCG Oral Tab, TAKE ONE TABLET BY MOUTH ONE TIME DAILY, Disp: 28 tablet, Rfl: 0    fluconazole (DIFLUCAN) 150 MG Oral Tab, Take 1 tablet (150 mg total) by mouth As Directed. Take one tablet by mouth today, repeat one tablet by mouth in 3 days, Disp: 2 tablet, Rfl: 0    nystatin 100,000 Units/g External Cream, Apply 1 Application topically 2 (two) times daily., Disp: 30 g, Rfl: 0    clobetasol 0.05 % External Ointment, Apply 1 Application topically 2 (two) times daily., Disp: 30 g, Rfl:  0    ALLERGIES:    Allergies   Allergen Reactions    Sulfamethoxazole W/Trimethoprim HIVES and RASH    Bactrim [Sulfamethoxazole W/Trimethoprim, Co-Trimoxazole]     Sulfamethoxazole HIVES         Review of Systems:  Constitutional:  Denies fatigue, night sweats, hot flashes  Cardiovascular:  denies chest pain or palpitations  Respiratory:  denies shortness of breath  Gastrointestinal:  denies heartburn, abdominal pain, diarrhea or constipation  Genitourinary:  denies dysuria, incontinence, abnormal vaginal discharge, vaginal itching   Musculoskeletal:  denies back pain.  Skin/Breast:  Denies any breast pain, lumps, or discharge.   Neurological:  denies headaches, extremity weakness or numbness.  Psychiatric: denies depression or anxiety.  Endocrine:   denies excessive thirst or urination.  Heme/Lymph:  denies history of anemia, easy bruising or bleeding.      PHYSICAL EXAM:     Vitals:    08/22/24 1824 08/22/24 1830   BP: (!) 142/94 142/90   Pulse:  70   Weight: 173 lb (78.5 kg)    Height: 5' 7.4\" (1.712 m)      Body mass index is 26.77 kg/m².     Patient offered chaperone, patient declined    Constitutional: well developed, well nourished    Psychiatric:  Oriented to time, place, person and situation. Appropriate mood and affect    Pelvic Exam:  External Genitalia: normal appearance, hair distribution, and no lesions  Urethral Meatus:  normal in size, location, without lesions and prolapse  Bladder:  No fullness, masses or tenderness  Vagina:  Normal appearance without lesions, no abnormal discharge  Cervix:  Normal without tenderness on motion  Uterus: normal in size, contour, position, mobility, without tenderness  Adnexa: normal without masses or tenderness  Perineum: normal  Anus: no hemorroids   Lymph node: no inguinal lymph nodes    Assessment & Plan:  Jessica was seen today for follow - up.    Diagnoses and all orders for this visit:    Pelvic pressure in female  -     Vaginitis Vaginosis PCR Panel;  Future  -     US PELVIS W EV (CPT=76856/51253); Future  -     Vaginitis Vaginosis PCR Panel      Repeat culture done  Patient feeling better but still with pelvic pressure - pelvic ultrasound ordered  If normal follow up with PCP    ORLANDO Salamanca    This note was prepared using Dragon Medical voice recognition dictation software. As a result errors may occur. When identified these errors have been corrected. While every attempt is made to correct errors during dictation discrepancies may still exist.

## 2024-08-23 LAB
BV BACTERIA DNA VAG QL NAA+PROBE: NEGATIVE
C GLABRATA DNA VAG QL NAA+PROBE: NEGATIVE
C KRUSEI DNA VAG QL NAA+PROBE: NEGATIVE
CANDIDA DNA VAG QL NAA+PROBE: NEGATIVE
T VAGINALIS DNA VAG QL NAA+PROBE: NEGATIVE

## 2024-08-24 DIAGNOSIS — Z01.419 ENCOUNTER FOR GYNECOLOGICAL EXAMINATION WITHOUT ABNORMAL FINDING: ICD-10-CM

## 2024-08-26 NOTE — TELEPHONE ENCOUNTER
Last annual - 5/4/2023  Las pap - 4/21/2022, negative  Last mammogram - 5/18/2023, negative    Next annual 9/6 w/Abena Haas.     To Abena Haas if ok to give one pack of Oral contraceptive's to cover until annual. Thank you.    Pt presents with c/o sob and dizziness for the past couple of days; states that she is suppose to be on BP meds but refused to take them and hasn't seen a doc in a couple of years out of neglect;

## 2024-08-28 RX ORDER — NORETHINDRONE ACETATE AND ETHINYL ESTRADIOL 1MG-20(21)
1 KIT ORAL DAILY
Qty: 28 TABLET | Refills: 0 | Status: SHIPPED | OUTPATIENT
Start: 2024-08-28

## 2024-09-06 ENCOUNTER — OFFICE VISIT (OUTPATIENT)
Dept: OBGYN CLINIC | Facility: CLINIC | Age: 49
End: 2024-09-06

## 2024-09-06 VITALS
SYSTOLIC BLOOD PRESSURE: 128 MMHG | DIASTOLIC BLOOD PRESSURE: 88 MMHG | WEIGHT: 172 LBS | HEART RATE: 81 BPM | BODY MASS INDEX: 26.68 KG/M2 | HEIGHT: 67.4 IN

## 2024-09-06 DIAGNOSIS — Z01.419 ENCOUNTER FOR GYNECOLOGICAL EXAMINATION WITHOUT ABNORMAL FINDING: ICD-10-CM

## 2024-09-06 DIAGNOSIS — Z01.419 WELL WOMAN EXAM WITH ROUTINE GYNECOLOGICAL EXAM: Primary | ICD-10-CM

## 2024-09-06 DIAGNOSIS — Z30.41 ORAL CONTRACEPTIVE PILL SURVEILLANCE: ICD-10-CM

## 2024-09-06 DIAGNOSIS — Z12.31 ENCOUNTER FOR SCREENING MAMMOGRAM FOR MALIGNANT NEOPLASM OF BREAST: ICD-10-CM

## 2024-09-06 DIAGNOSIS — Z12.11 SCREEN FOR COLON CANCER: ICD-10-CM

## 2024-09-06 PROCEDURE — 99396 PREV VISIT EST AGE 40-64: CPT | Performed by: NURSE PRACTITIONER

## 2024-09-06 RX ORDER — NORETHINDRONE ACETATE AND ETHINYL ESTRADIOL 1MG-20(21)
1 KIT ORAL DAILY
Qty: 84 TABLET | Refills: 4 | Status: SHIPPED | OUTPATIENT
Start: 2024-09-06

## 2024-09-06 NOTE — PROGRESS NOTES
[facility]    Obstetrics and Gynecology    Chief Complaint   Patient presents with    Gyn Exam     Annual // Oral contraceptive refill // Mammo order        Jessica Mcdowell is a 49 year old female  Patient's last menstrual period was 2024 (approximate). presenting for annual gynecology exam.  Last seen . C/o pelvic pressure - ultrasound scheduled. States now pressure resolved.  Taking probiotics/greek yogurt.    She is sexually active same partner x 19 years.    Taking ocps - regular periods but inconsistent. Sometimes no bleeding or light for last 10 years. Happy with method. Desires to continue. Seeing new PCP. Bp wnl.     Pap:2022 NILM, neg HPV  Contraception: ocps  Mammo: 2023 normal  Colonoscopy: needs  Dexa: n/a    OBSTETRICS HISTORY:  OB History    Para Term  AB Living   5 3 3 0 2 3   SAB IAB Ectopic Multiple Live Births   0 0 0 0 3       GYNE HISTORY:  Menarche: 16 (2024 10:31 AM)  Period Cycle (Days): Monthly (2024 10:31 AM)  Period Duration (Days): 5-7 DAYS (2024 10:31 AM)  Period Flow: LIGHT (2024 10:31 AM)  Use of Birth Control (if yes, specify type): OCP (2024 10:31 AM)  Date When Birth Control Last Used: 24 OCP (2024 10:31 AM)  Hx Prior Abnormal Pap: No (2024 10:31 AM)  Pap Date: 22 (2024 10:31 AM)  Pap Result Notes: Neg Pap/HPV // Mammo 23 Diag C1- Neg (2024 10:31 AM)  Follow Up Recommendation: Annual 23 CAP (2024 10:31 AM)      History   Sexual Activity    Sexual activity: Yes    Partners: Male    Birth control/ protection: OCP           Latest Ref Rng & Units 2022     1:48 PM 1/3/2018    10:47 AM   RECENT PAP RESULTS   Thinprep Pap Negative for intraepithelial lesion or malignancy Negative for intraepithelial lesion or malignancy  Negative for intraepithelial lesion or malignancy    HPV Negative Negative  Negative          MEDICAL HISTORY:  Past Medical History:    Anemia in pregnancy (HCC)     Psoriasis     Past Surgical History:   Procedure Laterality Date             SOCIAL HISTORY:  Social History     Socioeconomic History    Marital status: Single     Spouse name: Not on file    Number of children: Not on file    Years of education: Not on file    Highest education level: Not on file   Occupational History    Not on file   Tobacco Use    Smoking status: Never    Smokeless tobacco: Never   Substance and Sexual Activity    Alcohol use: No     Alcohol/week: 0.0 standard drinks of alcohol    Drug use: No    Sexual activity: Yes     Partners: Male     Birth control/protection: OCP   Other Topics Concern    Grew up on a farm Not Asked    History of tanning Not Asked    Outdoor occupation Not Asked    Pt has a pacemaker No    Pt has a defibrillator No    Breast feeding No    Reaction to local anesthetic No   Social History Narrative    Not on file     Social Determinants of Health     Financial Resource Strain: Not on file   Food Insecurity: Not on file   Transportation Needs: Not on file   Physical Activity: Not on file   Stress: Not on file   Social Connections: Unknown (3/14/2021)    [facility], [facility]    Social Connections     Conversations with friends/family/neighbors per week: Not on file   Housing Stability: Low Risk  (2021)    Received from [facility], [facility]    Housing Stability     Mortgage Payment Concerns?: Not on file     Number of Places Lived in the Last Year: Not on file     Unstable Housing?: Not on file         Depression Screening (PHQ-2/PHQ-9): Over the LAST 2 WEEKS   Little interest or pleasure in doing things (over the last two weeks)?: Not at all    Feeling down, depressed, or hopeless (over the last two weeks)?: Not at all    PHQ-2 SCORE: 0           FAMILY HISTORY:  Family History   Problem Relation Age of Onset    Diabetes Mother     Heart Disease Mother         MEDICATIONS:    Current Outpatient Medications:     Norethin Ace-Eth Estrad-FE ([facility] 1/20) 1-20 MG-MCG Oral Tab, Take 1 tablet by mouth daily., Disp: 84 tablet, Rfl: 4    ALLERGIES:    Allergies   Allergen Reactions    Sulfamethoxazole W/Trimethoprim HIVES and RASH    Bactrim [Sulfamethoxazole W/Trimethoprim, Co-Trimoxazole]     Sulfamethoxazole HIVES         Review of Systems:  Constitutional:  Denies fatigue, night sweats, hot flashes  Eyes:  denies blurred or double vision  Cardiovascular:  denies chest pain or palpitations  Respiratory:  denies shortness of breath  Gastrointestinal:  denies heartburn, abdominal pain, diarrhea or constipation  Genitourinary:  denies dysuria, incontinence, abnormal vaginal discharge, vaginal itching,   Musculoskeletal:  denies back pain   Skin/Breast:  Denies any breast pain, lumps, or discharge.   Neurological:  denies headaches, extremity weakness or numbness.  Psychiatric: denies depression or anxiety.  Endocrine:   denies excessive thirst or urination.  Heme/Lymph:  denies history of anemia, easy bruising or bleeding.      PHYSICAL EXAM:     Vitals:    09/06/24 1031   BP: 128/88   Pulse: 81   Weight: 172 lb (78 kg)   Height: 5' 7.4\" (1.712 m)       Body mass index is 26.62 kg/m².     Patient offered chaperone, patient declined    Constitutional: well developed, well nourished  Psychiatric:  Oriented to time, place, person and situation. Appropriate mood and affect  Head/Face: normocephalic  Neck/Thyroid: thyroid symmetric, no thyromegaly, no nodules, no adenopathy  Lymphatic:no abnormal supraclavicular or axillary adenopathy is noted  Breast: normal without palpable masses, tenderness, asymmetry, nipple discharge, nipple retraction or skin changes  Abdomen:  soft, nontender, nondistended, no masses  Skin/Hair: no unusual rashes or bruises  Extremities: no edema, no cyanosis    Pelvic Exam:  External Genitalia: normal appearance, hair distribution, and no  lesions  Urethral Meatus:  normal in size, location, without lesions and prolapse  Bladder:  No fullness, masses or tenderness  Vagina:  Normal appearance without lesions, no abnormal discharge  Cervix:  Normal without tenderness on motion  Uterus: normal in size, contour, position, mobility, without tenderness  Adnexa: normal without masses or tenderness  Perineum: normal  Anus: no hemorroids     Assessment & Plan:    ICD-10-CM    1. Well woman exam with routine gynecological exam  Z01.419       2. Encounter for screening mammogram for malignant neoplasm of breast  Z12.31 [facility] NESS 2D+3D SCREENING BILAT (CPT=77067/97769)      3. Oral contraceptive pill surveillance  Z30.41       4. Encounter for gynecological examination without abnormal finding  Z01.419 Norethin Ace-Eth Estrad-FE (LEA PRASAD 1/20) 1-20 MG-MCG Oral Tab      5. Screen for colon cancer  Z12.11 Gastro Referral - In Network         Reviewed ASCCP guidelines with the patient   Pap due in 2025  Contraception: Using ocps. Desires to continue. Reviewed risks benefits and SE with ocps.  GI referral placed  Breast Health:     Reviewed current guidelines with the patient and to start Mammograms at age 40 - ordered  Reviewed monthly self breast exams with the patient   Discussed diet, exercise, MVIs with Ca/Vit D  Follow up in 1 yr for ORLANDO Bee    This note was prepared using Dragon Medical voice recognition dictation software. As a result errors may occur. When identified these errors have been corrected. While every attempt is made to correct errors during dictation discrepancies may still exist.

## 2024-09-27 ENCOUNTER — HOSPITAL ENCOUNTER (OUTPATIENT)
Dept: MAMMOGRAPHY | Age: 49
Discharge: HOME | End: 2024-09-27
Attending: NURSE PRACTITIONER
Payer: COMMERCIAL

## 2024-09-27 DIAGNOSIS — Z12.31 ENCOUNTER FOR SCREENING MAMMOGRAM FOR MALIGNANT NEOPLASM OF BREAST: ICD-10-CM

## 2024-09-27 PROCEDURE — 77067 SCR MAMMO BI INCL CAD: CPT | Performed by: NURSE PRACTITIONER

## 2024-09-27 PROCEDURE — 77063 BREAST TOMOSYNTHESIS BI: CPT | Performed by: NURSE PRACTITIONER

## 2024-10-03 ENCOUNTER — HOSPITAL ENCOUNTER (OUTPATIENT)
Dept: ULTRASOUND IMAGING | Age: 49
Discharge: HOME | End: 2024-10-03
Attending: NURSE PRACTITIONER
Payer: COMMERCIAL

## 2024-10-03 DIAGNOSIS — R10.2 PELVIC PRESSURE IN FEMALE: ICD-10-CM

## 2024-10-03 PROCEDURE — 76856 US EXAM PELVIC COMPLETE: CPT | Performed by: NURSE PRACTITIONER

## 2025-04-25 ENCOUNTER — OFFICE VISIT (OUTPATIENT)
Dept: FAMILY MEDICINE CLINIC | Facility: CLINIC | Age: 50
End: 2025-04-25
Payer: COMMERCIAL

## 2025-04-25 VITALS
RESPIRATION RATE: 16 BRPM | DIASTOLIC BLOOD PRESSURE: 90 MMHG | OXYGEN SATURATION: 100 % | HEART RATE: 79 BPM | TEMPERATURE: 98 F | SYSTOLIC BLOOD PRESSURE: 122 MMHG | BODY MASS INDEX: 26 KG/M2 | WEIGHT: 170 LBS

## 2025-04-25 DIAGNOSIS — R10.9 ABDOMINAL PAIN, UNSPECIFIED ABDOMINAL LOCATION: Primary | ICD-10-CM

## 2025-04-25 LAB
APPEARANCE: CLEAR
BILIRUBIN: NEGATIVE
GLUCOSE (URINE DIPSTICK): NEGATIVE MG/DL
KETONES (URINE DIPSTICK): NEGATIVE MG/DL
MULTISTIX LOT#: ABNORMAL NUMERIC
NITRITE, URINE: NEGATIVE
OCCULT BLOOD: NEGATIVE
PH, URINE: 5.5 (ref 4.5–8)
PROTEIN (URINE DIPSTICK): NEGATIVE MG/DL
SPECIFIC GRAVITY: >1.03 (ref 1–1.03)
URINE-COLOR: YELLOW
UROBILINOGEN,SEMI-QN: 0.2 MG/DL (ref 0–1.9)

## 2025-04-25 RX ORDER — FLUCONAZOLE 150 MG/1
150 TABLET ORAL ONCE
Qty: 1 TABLET | Refills: 0 | Status: SHIPPED | OUTPATIENT
Start: 2025-04-25 | End: 2025-04-25

## 2025-04-26 NOTE — PROGRESS NOTES
Subjective:   Patient ID: Jessica Mcdowell is a 49 year old female.    Patient presents with two day history of low back pain and abdominal pain. Notes diarrhea this morning prior to working today, was on her feet all day today. Denies frequency, urgency or hematuria, denies nausea/vomiting. Denies vaginal itching or discharge. Periods are becoming more irregular. Has drank lots of water. Has tried nothing for symptoms today. Recent antibiotic use.        History/Other:   Review of Systems   Gastrointestinal:  Positive for abdominal pain and diarrhea.   Musculoskeletal:  Positive for back pain.   All other systems reviewed and are negative.    Current Medications[1]  Allergies:Allergies[2]    /90   Pulse 79   Temp 98.2 °F (36.8 °C) (Oral)   Resp 16   Wt 170 lb (77.1 kg)   LMP 03/26/2025 (Approximate)   SpO2 100%   BMI 26.31 kg/m²       Objective:   Physical Exam  Constitutional:       General: She is not in acute distress.     Appearance: Normal appearance. She is not ill-appearing.   HENT:      Head: Normocephalic and atraumatic.   Eyes:      Extraocular Movements: Extraocular movements intact.      Pupils: Pupils are equal, round, and reactive to light.   Cardiovascular:      Rate and Rhythm: Normal rate and regular rhythm.      Pulses: Normal pulses.      Heart sounds: Normal heart sounds.   Pulmonary:      Effort: Pulmonary effort is normal. No respiratory distress.      Breath sounds: Normal breath sounds.   Abdominal:      General: Abdomen is flat. Bowel sounds are normal.      Palpations: Abdomen is soft.      Tenderness: There is abdominal tenderness in the suprapubic area. There is no right CVA tenderness or left CVA tenderness.      Comments: Mild tenderness over suprapubic area, no masses palpated.   Musculoskeletal:         General: Normal range of motion.      Cervical back: Normal range of motion and neck supple.   Skin:     General: Skin is warm and dry.      Capillary Refill: Capillary  refill takes less than 2 seconds.   Neurological:      General: No focal deficit present.      Mental Status: She is alert and oriented to person, place, and time.   Psychiatric:         Mood and Affect: Mood normal.         Behavior: Behavior normal.     Results for orders placed or performed in visit on 04/25/25   URINALYSIS, AUTO, W/O SCOPE    Collection Time: 04/25/25  7:07 PM   Result Value Ref Range    Glucose Urine Negative Negative mg/dL    Bilirubin Urine Negative Negative    Ketones, UA Negative Negative - Trace mg/dL    Spec Gravity >1.030 1.005 - 1.030    Blood Urine Negative Negative    PH Urine 5.5 5.0 - 8.0    Protein Urine Negative Negative - Trace mg/dL    Urobilinogen Urine 0.2 0.2 - 1.0 mg/dL    Nitrite Urine Negative Negative    Leukocyte Esterase Urine Trace (A) Negative    APPEARANCE clear Clear    Color Urine yellow Yellow    Multistix Lot# [id number] Numeric    Multistix Expiration Date 10/31/25 Date       Assessment & Plan:   1. Abdominal pain, unspecified abdominal location        Orders Placed This Encounter   Procedures    URINALYSIS, AUTO, W/O SCOPE       Meds This Visit:  Requested Prescriptions     Signed Prescriptions Disp Refills    fluconazole 150 MG Oral Tab 1 tablet 0     Sig: Take 1 tablet (150 mg total) by mouth once for 1 dose.     Medication use and risk/benefit discussed. Discussed symptoms not likely to be urinary tract, unsure if bladder pressure due to constipation/diarrhea or retaining. Close follow up advised as below. Patient verbalized understanding and agrees to plan.    Patient Instructions   Take Fluconazole once by mouth tomorrow for one dose. May take with food. Avoid alcohol for 72 hours during use.  May take Motrin (Ibuprofen, Advil) 400 mg every 6 hours as needed for pain.   Warm pack to abdomen, watch for skin burns  Warm water over the lower abdomen can help for comfort.   Drink lots of water to avoid dehydration.  Follow up with ob/gyne as discussed. Go  directly to the Immediate care or emergency room if your pain worsens, if you develop fever, blood in urine, inability to urinate, vomiting or any other worsening symptoms.                   [1]   Current Outpatient Medications   Medication Sig Dispense Refill    fluconazole 150 MG Oral Tab Take 1 tablet (150 mg total) by mouth once for 1 dose. 1 tablet 0    Norethin Ace-Eth Estrad-FE (LEA PRASAD 1/20) 1-20 MG-MCG Oral Tab Take 1 tablet by mouth daily. 84 tablet 4   [2]   Allergies  Allergen Reactions    Sulfamethoxazole W/Trimethoprim HIVES and RASH    Bactrim [Sulfamethoxazole W/Trimethoprim, Co-Trimoxazole]     Sulfamethoxazole HIVES

## 2025-04-26 NOTE — PATIENT INSTRUCTIONS
Take Fluconazole once by mouth tomorrow for one dose. May take with food. Avoid alcohol for 72 hours during use.  May take Motrin (Ibuprofen, Advil) 400 mg every 6 hours as needed for pain.   Warm pack to abdomen, watch for skin burns  Warm water over the lower abdomen can help for comfort.   Drink lots of water to avoid dehydration.  Follow up with ob/gyne as discussed. Go directly to the Immediate care or emergency room if your pain worsens, if you develop fever, blood in urine, inability to urinate, vomiting or any other worsening symptoms.

## (undated) NOTE — LETTER
1/24/2018              [address]        [address] [id number]         Dear Unknown Chelsey,      It was a pleasure to see you at our [address] [facility] office.   Neg pap, HPV neg, repeat p

## (undated) NOTE — LETTER
8/14/2017              [address]        [address] [id number]         Dear Jamie Kraus,      It was a pleasure to see you at our [address] office.  Mammogram is normal, repeat

## (undated) NOTE — LETTER
Date: 12/28/2021    Patient Name: eMrcy Tam          To Whom it may concern: This letter has been written at the patient's request. The above patient was seen at the [facility] for treatment of a medical condition.     This patient s

## (undated) NOTE — LETTER
1/6/2020              [address]        [address]        [facility] [address]         Dear Kristal Reyes,      It was a pleasure to see you at our [address] [place] [address] office.   Your mammogram is normal,

## (undated) NOTE — LETTER
12/28/2018              Tavo LACY        [address]        [facility] [address]         Dear Arnell Cockayne,      It was a pleasure to see you at our [address] [place] [address] office.   Mammogram is normal, repe